# Patient Record
Sex: MALE | Race: WHITE | URBAN - METROPOLITAN AREA
[De-identification: names, ages, dates, MRNs, and addresses within clinical notes are randomized per-mention and may not be internally consistent; named-entity substitution may affect disease eponyms.]

---

## 2017-08-09 ENCOUNTER — IMPORTED ENCOUNTER (OUTPATIENT)
Dept: URBAN - METROPOLITAN AREA CLINIC 50 | Facility: CLINIC | Age: 77
End: 2017-08-09

## 2017-08-10 ENCOUNTER — IMPORTED ENCOUNTER (OUTPATIENT)
Dept: URBAN - METROPOLITAN AREA CLINIC 50 | Facility: CLINIC | Age: 77
End: 2017-08-10

## 2017-08-18 ENCOUNTER — IMPORTED ENCOUNTER (OUTPATIENT)
Dept: URBAN - METROPOLITAN AREA CLINIC 50 | Facility: CLINIC | Age: 77
End: 2017-08-18

## 2017-08-29 ENCOUNTER — IMPORTED ENCOUNTER (OUTPATIENT)
Dept: URBAN - METROPOLITAN AREA CLINIC 50 | Facility: CLINIC | Age: 77
End: 2017-08-29

## 2017-10-24 ENCOUNTER — IMPORTED ENCOUNTER (OUTPATIENT)
Dept: URBAN - METROPOLITAN AREA CLINIC 50 | Facility: CLINIC | Age: 77
End: 2017-10-24

## 2018-04-24 ENCOUNTER — IMPORTED ENCOUNTER (OUTPATIENT)
Dept: URBAN - METROPOLITAN AREA CLINIC 50 | Facility: CLINIC | Age: 78
End: 2018-04-24

## 2019-05-29 ENCOUNTER — IMPORTED ENCOUNTER (OUTPATIENT)
Dept: URBAN - METROPOLITAN AREA CLINIC 50 | Facility: CLINIC | Age: 79
End: 2019-05-29

## 2019-07-10 ENCOUNTER — IMPORTED ENCOUNTER (OUTPATIENT)
Dept: URBAN - METROPOLITAN AREA CLINIC 50 | Facility: CLINIC | Age: 79
End: 2019-07-10

## 2020-12-07 ENCOUNTER — IMPORTED ENCOUNTER (OUTPATIENT)
Dept: URBAN - METROPOLITAN AREA CLINIC 50 | Facility: CLINIC | Age: 80
End: 2020-12-07

## 2021-04-17 ASSESSMENT — TONOMETRY
OD_IOP_MMHG: 12
OD_IOP_MMHG: 10
OS_IOP_MMHG: 12
OD_IOP_MMHG: 12
OS_IOP_MMHG: 10
OS_IOP_MMHG: 11
OS_IOP_MMHG: 12
OD_IOP_MMHG: 11
OD_IOP_MMHG: 12
OD_IOP_MMHG: 12
OS_IOP_MMHG: 12
OS_IOP_MMHG: 12

## 2021-04-17 ASSESSMENT — VISUAL ACUITY
OD_CC: 20/30-2
OS_CC: 20/25-2
OS_OTHER: 20/30. 20/70.
OD_CC: J1+
OS_CC: 20/20
OS_CC: J1@ 15 IN
OD_CC: 20/25
OD_CC: 20/30-1
OD_CC: 20/30+
OD_OTHER: 20/40. 20/70.
OD_CC: 20/25
OD_CC: J1
OS_CC: 20/20-1
OD_CC: J1@ 15 IN
OD_CC: J1@ 15 IN
OD_CC: 20/25-2
OS_CC: 20/25-2
OS_CC: J1+
OS_CC: 20/20-2
OD_BAT: 20/40
OS_CC: J1
OS_CC: J1@ 15 IN
OS_BAT: 20/30
OD_OTHER: 20/25. 20/25.
OD_BAT: 20/25
OD_CC: 20/25
OS_CC: 20/25
OS_CC: 20/20

## 2021-09-15 ENCOUNTER — PROBLEM (OUTPATIENT)
Dept: URBAN - METROPOLITAN AREA CLINIC 53 | Facility: CLINIC | Age: 81
End: 2021-09-15

## 2021-09-15 DIAGNOSIS — H16.222: ICD-10-CM

## 2021-09-15 PROCEDURE — 92012 INTRM OPH EXAM EST PATIENT: CPT

## 2021-09-15 ASSESSMENT — TONOMETRY
OD_IOP_MMHG: 13
OS_IOP_MMHG: 13

## 2021-09-15 ASSESSMENT — VISUAL ACUITY
OS_CC: 20/20-
OD_CC: 20/20-

## 2021-09-15 NOTE — PATIENT DISCUSSION
Drop regiment discussed with patient today. Start PF tears OU 3-4x/day. Start warm compresses OU BID. Start lid scrubs OU BID.

## 2021-12-16 ENCOUNTER — COMPREHENSIVE EXAM (OUTPATIENT)
Dept: URBAN - METROPOLITAN AREA CLINIC 52 | Facility: CLINIC | Age: 81
End: 2021-12-16

## 2021-12-16 DIAGNOSIS — H16.222: ICD-10-CM

## 2021-12-16 DIAGNOSIS — H43.813: ICD-10-CM

## 2021-12-16 DIAGNOSIS — H26.491: ICD-10-CM

## 2021-12-16 PROCEDURE — 92014 COMPRE OPH EXAM EST PT 1/>: CPT

## 2021-12-16 ASSESSMENT — TONOMETRY
OD_IOP_MMHG: 10
OS_IOP_MMHG: 10

## 2021-12-16 ASSESSMENT — VISUAL ACUITY
OD_GLARE: 20/40
OD_GLARE: 20/30
OU_CC: J1+

## 2022-12-29 ENCOUNTER — COMPREHENSIVE EXAM (OUTPATIENT)
Dept: URBAN - METROPOLITAN AREA CLINIC 52 | Facility: CLINIC | Age: 82
End: 2022-12-29

## 2022-12-29 PROCEDURE — 92015 DETERMINE REFRACTIVE STATE: CPT

## 2022-12-29 PROCEDURE — 92014 COMPRE OPH EXAM EST PT 1/>: CPT

## 2022-12-29 ASSESSMENT — VISUAL ACUITY
OD_PH: 20/25
OU_CC: 20/25
OD_GLARE: 20/70
OD_GLARE: 20/50
OS_CC: 20/25
OD_CC: 20/30
OU_CC: J1+

## 2022-12-29 ASSESSMENT — TONOMETRY
OS_IOP_MMHG: 11
OD_IOP_MMHG: 11

## 2022-12-29 NOTE — PATIENT DISCUSSION
PCO ([id number]): Visually significant PCO present on exam today. Recommend YAG laser capsulotomy to improve vision and decrease glare symptoms. RBAs of procedure discussed. Patient agrees and wishes to proceed.

## 2023-01-12 ENCOUNTER — CLINIC PROCEDURE ONLY (OUTPATIENT)
Dept: URBAN - METROPOLITAN AREA CLINIC 52 | Facility: CLINIC | Age: 83
End: 2023-01-12

## 2023-01-12 DIAGNOSIS — H26.491: ICD-10-CM

## 2023-01-12 PROCEDURE — 66821 AFTER CATARACT LASER SURGERY: CPT

## 2023-01-12 RX ORDER — PREDNISOLONE ACETATE 10 MG/ML: 1 SUSPENSION/ DROPS OPHTHALMIC TWICE A DAY

## 2023-01-12 ASSESSMENT — VISUAL ACUITY
OD_CC: 20/20-2
OS_CC: 20/20-2

## 2023-01-12 ASSESSMENT — TONOMETRY
OD_IOP_MMHG: 10
OS_IOP_MMHG: 09

## 2023-01-12 NOTE — PATIENT DISCUSSION
PCO ([facility]): Visually significant PCO present on exam today. Recommend YAG laser capsulotomy to improve vision and decrease glare symptoms. RBAs of procedure discussed. Patient agrees and wishes to proceed.

## 2023-01-12 NOTE — PROCEDURE NOTE: CLINICAL
PROCEDURE NOTE: YAG Capsulotomy OD. Diagnosis: Posterior Capsular Opacification (PCO). Prep: Mydriacil 1% and Phenylephrine 2.5%. Prior to treatment, the risks/benefits/alternatives were discussed. The patient wished to proceed with procedure. Consent was signed. Proparacaine and brominidine were placed into the operative eye after the eye was dilated. Power = 3.8mJ. Number of pulses = 59. Patient tolerated procedure well and there were no complications. Post Laser instructions given. Amelia Castro

## 2024-04-15 ENCOUNTER — COMPREHENSIVE EXAM (OUTPATIENT)
Dept: URBAN - METROPOLITAN AREA CLINIC 52 | Facility: CLINIC | Age: 84
End: 2024-04-15

## 2024-04-15 DIAGNOSIS — H40.013: ICD-10-CM

## 2024-04-15 DIAGNOSIS — H40.041: ICD-10-CM

## 2024-04-15 DIAGNOSIS — H43.813: ICD-10-CM

## 2024-04-15 DIAGNOSIS — H35.033: ICD-10-CM

## 2024-04-15 DIAGNOSIS — H52.4: ICD-10-CM

## 2024-04-15 DIAGNOSIS — H16.222: ICD-10-CM

## 2024-04-15 PROCEDURE — 99214 OFFICE O/P EST MOD 30 MIN: CPT

## 2024-04-15 PROCEDURE — 92133 CPTRZD OPH DX IMG PST SGM ON: CPT

## 2024-04-15 ASSESSMENT — VISUAL ACUITY
OS_CC: 20/20
OS_GLARE: 20/20
OS_GLARE: 20/20
OU_CC: J1
OD_CC: 20/20-1
OD_GLARE: 20/20
OU_CC: 20/15
OD_GLARE: 20/20

## 2024-04-15 ASSESSMENT — KERATOMETRY
OS_AXISANGLE2_DEGREES: 175
OD_AXISANGLE_DEGREES: 116
OS_K2POWER_DIOPTERS: 43.50
OD_K1POWER_DIOPTERS: 42.25
OD_K2POWER_DIOPTERS: 43.00
OD_AXISANGLE2_DEGREES: 26
OS_AXISANGLE_DEGREES: 085
OS_K1POWER_DIOPTERS: 42.00

## 2024-04-15 ASSESSMENT — TONOMETRY
OD_IOP_MMHG: 22
OS_IOP_MMHG: 12
OD_IOP_MMHG: 15
OS_IOP_MMHG: 19

## 2024-04-15 ASSESSMENT — PACHYMETRY
OS_CT_UM: 490
OD_CT_UM: 464

## 2024-06-17 ENCOUNTER — DIAGNOSTICS ONLY (OUTPATIENT)
Dept: URBAN - METROPOLITAN AREA CLINIC 52 | Facility: CLINIC | Age: 84
End: 2024-06-17

## 2024-06-17 DIAGNOSIS — H40.013: ICD-10-CM

## 2024-06-17 PROCEDURE — 92083 EXTENDED VISUAL FIELD XM: CPT

## 2024-06-17 PROCEDURE — 92133 CPTRZD OPH DX IMG PST SGM ON: CPT

## 2024-06-17 ASSESSMENT — KERATOMETRY
OS_K1POWER_DIOPTERS: 42.00
OS_K2POWER_DIOPTERS: 43.50
OD_AXISANGLE2_DEGREES: 26
OD_K2POWER_DIOPTERS: 43.00
OS_AXISANGLE2_DEGREES: 175
OD_AXISANGLE_DEGREES: 116
OS_AXISANGLE_DEGREES: 085
OD_K1POWER_DIOPTERS: 42.25